# Patient Record
Sex: MALE | Race: BLACK OR AFRICAN AMERICAN | NOT HISPANIC OR LATINO | URBAN - METROPOLITAN AREA
[De-identification: names, ages, dates, MRNs, and addresses within clinical notes are randomized per-mention and may not be internally consistent; named-entity substitution may affect disease eponyms.]

---

## 2023-08-10 ENCOUNTER — EMERGENCY (EMERGENCY)
Facility: HOSPITAL | Age: 23
LOS: 1 days | Discharge: HOME | End: 2023-08-10
Attending: EMERGENCY MEDICINE | Admitting: EMERGENCY MEDICINE
Payer: COMMERCIAL

## 2023-08-10 VITALS
RESPIRATION RATE: 16 BRPM | DIASTOLIC BLOOD PRESSURE: 81 MMHG | HEART RATE: 96 BPM | OXYGEN SATURATION: 99 % | SYSTOLIC BLOOD PRESSURE: 144 MMHG | TEMPERATURE: 98 F

## 2023-08-10 DIAGNOSIS — T78.3XXA ANGIONEUROTIC EDEMA, INITIAL ENCOUNTER: ICD-10-CM

## 2023-08-10 PROCEDURE — 99223 1ST HOSP IP/OBS HIGH 75: CPT

## 2023-08-10 RX ORDER — FAMOTIDINE 10 MG/ML
20 INJECTION INTRAVENOUS EVERY 12 HOURS
Refills: 0 | Status: DISCONTINUED | OUTPATIENT
Start: 2023-08-10 | End: 2023-08-13

## 2023-08-10 RX ORDER — DEXAMETHASONE 0.5 MG/5ML
10 ELIXIR ORAL EVERY 8 HOURS
Refills: 0 | Status: DISCONTINUED | OUTPATIENT
Start: 2023-08-10 | End: 2023-08-10

## 2023-08-10 RX ORDER — DIPHENHYDRAMINE HCL 50 MG
25 CAPSULE ORAL ONCE
Refills: 0 | Status: COMPLETED | OUTPATIENT
Start: 2023-08-10 | End: 2023-08-10

## 2023-08-10 RX ORDER — DEXAMETHASONE 0.5 MG/5ML
10 ELIXIR ORAL ONCE
Refills: 0 | Status: COMPLETED | OUTPATIENT
Start: 2023-08-10 | End: 2023-08-10

## 2023-08-10 RX ORDER — DIPHENHYDRAMINE HCL 50 MG
25 CAPSULE ORAL EVERY 8 HOURS
Refills: 0 | Status: DISCONTINUED | OUTPATIENT
Start: 2023-08-10 | End: 2023-08-13

## 2023-08-10 RX ORDER — FAMOTIDINE 10 MG/ML
20 INJECTION INTRAVENOUS ONCE
Refills: 0 | Status: COMPLETED | OUTPATIENT
Start: 2023-08-10 | End: 2023-08-10

## 2023-08-10 RX ORDER — SODIUM CHLORIDE 9 MG/ML
1000 INJECTION INTRAMUSCULAR; INTRAVENOUS; SUBCUTANEOUS
Refills: 0 | Status: DISCONTINUED | OUTPATIENT
Start: 2023-08-10 | End: 2023-08-13

## 2023-08-10 RX ORDER — DEXAMETHASONE 0.5 MG/5ML
10 ELIXIR ORAL EVERY 8 HOURS
Refills: 0 | Status: DISCONTINUED | OUTPATIENT
Start: 2023-08-10 | End: 2023-08-13

## 2023-08-10 RX ADMIN — SODIUM CHLORIDE 100 MILLILITER(S): 9 INJECTION INTRAMUSCULAR; INTRAVENOUS; SUBCUTANEOUS at 19:46

## 2023-08-10 RX ADMIN — Medication 102 MILLIGRAM(S): at 20:48

## 2023-08-10 RX ADMIN — Medication 25 MILLIGRAM(S): at 20:47

## 2023-08-10 RX ADMIN — Medication 102 MILLIGRAM(S): at 12:45

## 2023-08-10 RX ADMIN — Medication 25 MILLIGRAM(S): at 12:42

## 2023-08-10 RX ADMIN — FAMOTIDINE 20 MILLIGRAM(S): 10 INJECTION INTRAVENOUS at 12:42

## 2023-08-10 NOTE — ED ADULT TRIAGE NOTE - CHIEF COMPLAINT QUOTE
Pt presents for possible allergic reaction to peach, pt endorses swelling/difficulty swallowing/speaking related to tightness to throat, pt took 50mg of benadryl. Pt denies shortness of breath. No known allergies prior.

## 2023-08-10 NOTE — CONSULT NOTE ADULT - SUBJECTIVE AND OBJECTIVE BOX
CC: Airway evaluation    HPI: 23-year-old male with PMH of seasonal allergy presents for concern of allergic reaction.  Patient ate a peach slice around 9:20 AM and then developed shortness of breath, throat tightness, dysphagia with difficulty speaking and had 1 episode of nonbloody nonbilious vomiting.  Patient feels like he has throat swelling. Patient was given 50 mg of Benadryl at home by his friend who is a pharmacist tech that was with him.  Patient never ate peaches before in his life.  Mom reports allergy to pollen.  Denies chest pain abdominal pain, rashes, history of anaphylaxis. ENT called to evalaute the airway.     Patient seen and examined at bedside, reports the swollen sensation has improved significantly, able to tolerates own secretion, no difficulty breathing, no stridor and no drooling.     PAST MEDICAL & SURGICAL HISTORY:    Allergies    No Known Allergies    Intolerances      MEDICATIONS  (STANDING):    MEDICATIONS  (PRN):      Social History: never smoker, never drinker    Family history: No pertinent family history in first degree relatives    ROS:   ENT: all negative except as noted in HPI   CV: denies palpitations  Pulm: denies SOB, cough, hemoptysis  GI: denies change in appetite, indigestion, n/v  : denies pertinent urinary symptoms, urgency  Neuro: denies numbness/tingling, loss of sensation  Psych: denies anxiety  MS: denies muscle weakness, instability  Heme: denies easy bruising or bleeding  Endo: denies heat/cold intolerance, excessive sweating  Vascular: denies LE edema    Vital Signs Last 24 Hrs  T(C): 36.7 (10 Aug 2023 10:32), Max: 36.7 (10 Aug 2023 10:32)  T(F): 98 (10 Aug 2023 10:32), Max: 98 (10 Aug 2023 10:32)  HR: 96 (10 Aug 2023 10:32) (96 - 96)  BP: 144/81 (10 Aug 2023 10:32) (144/81 - 144/81)  BP(mean): --  RR: 16 (10 Aug 2023 10:32) (16 - 16)  SpO2: 99% (10 Aug 2023 10:32) (99% - 99%)    Parameters below as of 10 Aug 2023 10:32  Patient On (Oxygen Delivery Method): room air                  PHYSICAL EXAM:  Gen: NAD  Skin: No rashes, bruises, or lesions  Head: Normocephalic, Atraumatic  Face: no edema, erythema, or fluctuance. Parotid glands soft without mass  Eyes: no scleral injection  Ears: No evidence of any fluid drainage. No mastoid tenderness, erythema, or ear bulging  Nose: Nares bilaterally patent, no discharge  Mouth: No stridor, no drooling, no trismus.  Mucosa moist, tongue/uvula midline, oropharynx clear  Neck: Flat, supple, no lymphadenopathy, trachea midline, no masses  Lymphatic: No lymphadenopathy  Resp: breathing easily, no stridor  CV: no peripheral edema/cyanosis  GI: nondistended   Peripheral vascular: no JVD or edema  Neuro: facial nerve intact, no facial droop    Fiberoptic Indirect laryngoscopy:  (Scope #2 used)  Flexible laryngoscopy was performed and revealed the following:    -- Nasopharynx had no mass or exudate.    -- Posterior pharyngeal wall clear, no edema, and no erythema    -- Base of tongue was symmetric and not enlarged     -- Vallecula was clear    -- Epiglottis, both aryepiglottic folds and both false vocal folds were normal    -- Bilateral watery edema of the arytenoid, R worse than left    -- True vocal folds were fully mobile and without lesions.     -- Post cricoid area clear, no edema and no erythema    -- Interarytenoid edema was absent    -- Airway patent CC: Airway evaluation    HPI: 23-year-old male with PMH of seasonal allergy presents for concern of allergic reaction.  Patient ate a peach slice around 9:20 AM and then developed shortness of breath, throat tightness, dysphagia with difficulty speaking and had 1 episode of nonbloody nonbilious vomiting.  Patient feels like he has throat swelling. Patient was given 50 mg of Benadryl at home by his friend who is a pharmacist tech that was with him.  Patient never ate peaches before in his life.  Mom reports allergy to pollen.  Denies chest pain abdominal pain, rashes, history of anaphylaxis. ENT called to evaluate the airway.     Patient seen and examined at bedside, reports the swollen sensation has improved significantly, able to tolerates own secretion, no difficulty breathing, no stridor and no drooling.     PAST MEDICAL & SURGICAL HISTORY:    Allergies    No Known Allergies    Intolerances      MEDICATIONS  (STANDING):    MEDICATIONS  (PRN):      Social History: never smoker, never drinker    Family history: No pertinent family history in first degree relatives    ROS:   ENT: all negative except as noted in HPI   CV: denies palpitations  Pulm: denies SOB, cough, hemoptysis  GI: denies change in appetite, indigestion, n/v  : denies pertinent urinary symptoms, urgency  Neuro: denies numbness/tingling, loss of sensation  Psych: denies anxiety  MS: denies muscle weakness, instability  Heme: denies easy bruising or bleeding  Endo: denies heat/cold intolerance, excessive sweating  Vascular: denies LE edema    Vital Signs Last 24 Hrs  T(C): 36.7 (10 Aug 2023 10:32), Max: 36.7 (10 Aug 2023 10:32)  T(F): 98 (10 Aug 2023 10:32), Max: 98 (10 Aug 2023 10:32)  HR: 96 (10 Aug 2023 10:32) (96 - 96)  BP: 144/81 (10 Aug 2023 10:32) (144/81 - 144/81)  BP(mean): --  RR: 16 (10 Aug 2023 10:32) (16 - 16)  SpO2: 99% (10 Aug 2023 10:32) (99% - 99%)    Parameters below as of 10 Aug 2023 10:32  Patient On (Oxygen Delivery Method): room air                  PHYSICAL EXAM:  Gen: NAD  Skin: No rashes, bruises, or lesions  Head: Normocephalic, Atraumatic  Face: no edema, erythema, or fluctuance. Parotid glands soft without mass  Eyes: no scleral injection  Ears: No evidence of any fluid drainage. No mastoid tenderness, erythema, or ear bulging  Nose: Nares bilaterally patent, no discharge  Mouth: No stridor, no drooling, no trismus.  Mucosa moist, tongue/uvula midline, oropharynx clear  Neck: Flat, supple, no lymphadenopathy, trachea midline, no masses  Lymphatic: No lymphadenopathy  Resp: breathing easily, no stridor  CV: no peripheral edema/cyanosis  GI: nondistended   Peripheral vascular: no JVD or edema  Neuro: facial nerve intact, no facial droop    Fiberoptic Indirect laryngoscopy:  (Scope #2 used)  Flexible laryngoscopy was performed and revealed the following:    -- Nasopharynx had no mass or exudate.    -- Posterior pharyngeal wall clear, no edema, and no erythema    -- Base of tongue was symmetric and not enlarged     -- Vallecula was clear    -- Epiglottis, both aryepiglottic folds and both false vocal folds were normal    -- Bilateral watery edema of the arytenoid, R worse than left    -- True vocal folds were fully mobile and without lesions.     -- Post cricoid area clear, no edema and no erythema    -- Interarytenoid edema was absent    -- Airway patent

## 2023-08-10 NOTE — ED ADULT NURSE REASSESSMENT NOTE - NS ED NURSE REASSESS COMMENT FT1
Break RN: pt resting comfortably in ED stretcher at this time, friend at bedside. pt is able to speak in short sentences, spitting up oral secretions. spo2 WNL, respirations are even and nonlabored on RA. 22g IV placed in RAC, medicated as ordered per EMR with no acute adverse reactions noted. no requests at this time.

## 2023-08-10 NOTE — ED ADULT NURSE NOTE - OBJECTIVE STATEMENT
Received pt in room 23, 23 yr/o male A+OX4, ambulatory at baseline. denies significant past medical history, pt has seasonal allergies. pt presented to the ED C/O allergic reaction to peaches, was given benadryl at home. VSS, denies chest pain and SOB, RR even and unlabored. lung sounds are clear and equal bilaterally.  airway appears clear, no edema noted. throat looks red and irritated. pt states he is unable to speak due to throat swelling and is using note pad to communicate. PERRLA and facial symmetry noted. abdomen is soft, flat, nontender, denies abdominal pain nausea, vomiting, diarrhea, and constipation. pt has active and passive ROM of all extremities. no obvious joint deformity noted. denies numbness, tingling, and weakness in peripheral extremities. skin is clean dry and intact. as per MD, no need for IV at this time.

## 2023-08-10 NOTE — ED CDU PROVIDER INITIAL DAY NOTE - NS ED ATTENDING STATEMENT MOD
This was a shared visit with the TRANG. I reviewed and verified the documentation and independently performed the documented:

## 2023-08-10 NOTE — ED PROVIDER NOTE - OBJECTIVE STATEMENT
23-year-old male with no PMH presents for concern of allergic reaction.  Patient ate a peach slice around 9:20 AM and then developed shortness of breath and had 1 episode of nonbloody nonbilious vomiting.  Patient feels like he has throat swelling. Patient was given 50 mg of Benadryl at home by his friend who is a pharmacist that was with him.  Patient never ate peaches before in his life.  Mom reports allergy to pollen.  Denies chest pain abdominal pain, rashes, history of anaphylaxis. 23-year-old male with no PMH presents for concern of allergic reaction.  Patient ate a peach slice around 9:20 AM and then developed shortness of breath and had 1 episode of nonbloody nonbilious vomiting.  Patient feels like he has throat swelling. Patient was given 50 mg of Benadryl at home by his friend who is a pharmacist that was with him.  Patient never ate peaches before in his life.  Mom reports allergy to pollen.  Denies chest pain abdominal pain, rashes, history of anaphylaxis.    Attending/Uri: 24 yo M as described above, no sig PMHx reports today after eating a peach candy slice of sensation of throat tightness and dysphagia with difficulty speaking. No prior h/o allergic reaction other than seasonal allergies. Deensi rash.

## 2023-08-10 NOTE — CONSULT NOTE ADULT - PROBLEM SELECTOR RECOMMENDATION 9
- Exam finding consistent with airway angioedema  - Decadron 10mg once  - benadryl 50mg bid  - pepcid 40mg bid  - HOB elevation  - ENT plan to rescope at 7pm, sooner if patient develeop acute respiratory distress  - call ENT if symptoms worsen or pt develops stridor   - Case discussed with Dr. Antunez - Exam finding consistent with airway angioedema with patent airway  - Decadron 10mg once  - benadryl 50mg bid  - pepcid 40mg bid  - HOB elevation  - ENT plan to rescope at 7pm, sooner if patient develeop acute respiratory distress  - call ENT if symptoms worsen or pt develops stridor   - Case discussed with Dr. Antunez

## 2023-08-10 NOTE — ED CDU PROVIDER INITIAL DAY NOTE - CLINICAL SUMMARY MEDICAL DECISION MAKING FREE TEXT BOX
23-year-old male with no PMH presents for concern of allergic reaction.  Patient ate a peach slice around 9:20 AM and then developed shortness of breath and had 1 episode of nonbloody nonbilious vomiting.  Patient feels like he has throat swelling. Patient was given 50 mg of Benadryl at home by his friend who is a pharmacist that was with him.  Patient never ate peaches before in his life.  Mom reports allergy to pollen.   continued allergic symptom management, airway monitoring   ENT scoped with noted airway angioedema.

## 2023-08-10 NOTE — ED PROVIDER NOTE - CLINICAL SUMMARY MEDICAL DECISION MAKING FREE TEXT BOX
23-year-old male with no PMH, no known allergies presents for presumed allergic reaction to peach.  Patient developed shortness of breath and vomiting after eating a piece lights at 9:20 AM.  On exam patient is in no acute distress, vital signs stable, hemodynamically stable, lung sounds clear to auscultation bilaterally, no rashes noted, posterior oropharyngeal erythema but no swelling.  Will consult ENT to scope patient.  Dispo pending clinical course.

## 2023-08-10 NOTE — ED CDU PROVIDER INITIAL DAY NOTE - OBJECTIVE STATEMENT
23-year-old male with no PMH presents for concern of allergic reaction.  Patient ate a peach slice around 9:20 AM and then developed shortness of breath and had 1 episode of nonbloody nonbilious vomiting.  Patient feels like he has throat swelling. Patient was given 50 mg of Benadryl at home by his friend who is a pharmacist that was with him.  Patient never ate peaches before in his life.  Mom reports allergy to pollen.  Denies chest pain abdominal pain, rashes, history of anaphylaxis. 23-year-old male with no PMH presents for concern of allergic reaction.  Patient ate a peach slice around 9:20 AM and then developed shortness of breath and had 1 episode of nonbloody nonbilious vomiting.  Patient feels like he has throat swelling. Patient was given 50 mg of Benadryl at home by his friend who is a pharmacist that was with him.  Patient never ate peaches before in his life.  Mom reports allergy to pollen.  Denies chest pain abdominal pain, rashes, history of anaphylaxis.    Attending/Uri: 24 yo M as described above, no sig PMHx had presented to the ED reporting a sensation of throat "tightness" after eating a peach flavored candy. In addition to throat tightness he reports difficulty speaking, and odynophagia.  Patient was seen and evaluated by ENT and noted to have [id number]; swelling. He was started on steroids and H1/H2 blockers and transferred to the CDU for further monitoring and management.

## 2023-08-10 NOTE — CONSULT NOTE ADULT - ASSESSMENT
23 M presents to ED for allergic reaction after eating fresh peach for the first time found to have angioedema of the airway with bilateral arytenoid watery edema, right worse than left on laryngoscope exam. Patient is not in any acute respiratory distress, able to tolerate own secretion no voice changes, no stridor and no drooling.  23 M presents to ED for allergic reaction after eating fresh peach for the first time found to have angioedema of the airway with bilateral arytenoid watery edema, right worse than left on laryngoscope exam, patent airway. Patient is not in any acute respiratory distress, able to tolerate own secretion no voice changes, no stridor and no drooling.

## 2023-08-10 NOTE — ED PROVIDER NOTE - PROGRESS NOTE DETAILS
Ryan Moreira MD PGY-2: ENT aware, will scope patient Uri: ENT scope reveal swelled laryngeal swelling, pt to received steroids, H1/H2 blockers, monitor and re-evaluated by ENT in 6 hours for repeat scope Ryan Moreira MD PGY-2: Pt reassessed, HDS, feels better no longer SOB. ENT to scope at 7pm

## 2023-08-10 NOTE — ED PROVIDER NOTE - PHYSICAL EXAMINATION
GENERAL: well appearing in no acute distress, non-toxic appearing  HEAD: normocephalic, atraumatic  HEENT: normal conjunctiva, oral mucosa moist, uvula midline, no oropharyngeal swelling noted  CARDIAC: regular rate and rhythm, normal S1S2, no appreciable murmurs, 2+ pulses in UE b/l  PULM: normal breath sounds, clear to ascultation bilaterally, no rales, rhonchi, wheezing  GI: abdomen nondistended, soft, nontender, no guarding, rebound tenderness  : no CVA tenderness b/l, no suprapubic tenderness  NEURO: normal speech, PER, AAOx3  MSK: no peripheral edema  SKIN: well-perfused, extremities warm, no visible rashes  PSYCH: appropriate mood and affect GENERAL: well appearing in no acute distress, non-toxic appearing  HEAD: normocephalic, atraumatic  HEENT: normal conjunctiva, oral mucosa moist, uvula midline, no oropharyngeal swelling noted  CARDIAC: regular rate and rhythm, normal S1S2, no appreciable murmurs, 2+ pulses in UE b/l  PULM: normal breath sounds, clear to ascultation bilaterally, no rales, rhonchi, wheezing  GI: abdomen nondistended, soft, nontender, no guarding, rebound tenderness  : no CVA tenderness b/l, no suprapubic tenderness  NEURO: normal speech, PER, AAOx3  MSK: no peripheral edema  SKIN: well-perfused, extremities warm, no visible rashes  PSYCH: appropriate mood and affect    Attending/Uri: NAD; PERRL/EOMI, non-icterus, O/P-posterior pharynx-mild erythema, no swelling, no pooling of secretions, no stridor, supple, no KUSH, no JVD, RRR, CTAB; Abd-soft, NT/ND, no HSM; no LE edema, A&Ox3, nonfocal; Skin-warm/dry

## 2023-08-11 VITALS
TEMPERATURE: 99 F | SYSTOLIC BLOOD PRESSURE: 120 MMHG | OXYGEN SATURATION: 100 % | DIASTOLIC BLOOD PRESSURE: 71 MMHG | RESPIRATION RATE: 18 BRPM | HEART RATE: 94 BPM

## 2023-08-11 PROCEDURE — 99239 HOSP IP/OBS DSCHRG MGMT >30: CPT

## 2023-08-11 RX ORDER — EPINEPHRINE 0.3 MG/.3ML
0.3 INJECTION INTRAMUSCULAR; SUBCUTANEOUS
Qty: 2 | Refills: 0
Start: 2023-08-11 | End: 2023-08-11

## 2023-08-11 RX ADMIN — Medication 25 MILLIGRAM(S): at 04:31

## 2023-08-11 RX ADMIN — Medication 102 MILLIGRAM(S): at 04:31

## 2023-08-11 RX ADMIN — SODIUM CHLORIDE 100 MILLILITER(S): 9 INJECTION INTRAMUSCULAR; INTRAVENOUS; SUBCUTANEOUS at 07:07

## 2023-08-11 NOTE — ED CDU PROVIDER SUBSEQUENT DAY NOTE - ATTENDING APP SHARED VISIT CONTRIBUTION OF CARE
Attending note:   After face to face evaluation of this patient, I concur with above noted hx, pe, and care plan for this patient.  Tuttle: 23-year-old male presented to ED with symptoms of sensation of throat closing after eating a peach.  Patient was initially scoped by ENT and noted some edema.  Patient was given Decadron and Pepcid and Benadryl and placed in CDU for further observation.  Patient was reevaluated by ENT this morning with almost complete resolution of previous findings.  Patient notes significant improvement in symptoms and feels at baseline.  Patient's oropharynx is normal and lungs are clear and heart is regular rate and rhythm.  Vitals are stable.  Patient is stable for discharge on Pepcid and Benadryl.

## 2023-08-11 NOTE — ED CDU PROVIDER SUBSEQUENT DAY NOTE - HISTORY
Patient is a 23-year-old male accepted to CDU for allergic reaction after eating a peach.  In interim patient is resting comfortably, no acute respiratory distress, patient continuing to receive Benadryl, steroids.  He is pending reevaluation from ENT.

## 2023-08-11 NOTE — PROGRESS NOTE ADULT - PROBLEM SELECTOR PLAN 1
- Improved and laryngeal edema resolved, patent airway  - Please discharge patient home on benadryl and pepcid for 24 hours  - HOB elevation  - Return precaution discussed with patient in detail  - Cross reaction printout given to patient as well by ED provider  - Advise patient to follow up outpatient with allergy and immunology for allergy workup  - Avoid eating peaches at all times   - Regular diet  - Patient is clear from ENT perspective to be discharged home this morning  - Dispo per ED/CDU  - Case discussed with Dr. Antunez.

## 2023-08-11 NOTE — ED CDU PROVIDER SUBSEQUENT DAY NOTE - PHYSICAL EXAMINATION
GENERAL: well appearing in no acute distress, non-toxic appearing  HEAD: normocephalic, atraumatic  HEENT: normal conjunctiva, oral mucosa moist, uvula midline, no oropharyngeal swelling noted  CARDIAC: regular rate and rhythm, normal S1S2, no appreciable murmurs, 2+ pulses in UE b/l  PULM: normal breath sounds, clear to ascultation bilaterally, no rales, rhonchi, wheezing  GI: abdomen nondistended, soft, nontender, no guarding, rebound tenderness  : no CVA tenderness b/l, no suprapubic tenderness  NEURO: normal speech, PER, AAOx3  MSK: no peripheral edema  SKIN: well-perfused, extremities warm, no visible rashes  PSYCH: appropriate mood and affect

## 2023-08-11 NOTE — PROGRESS NOTE ADULT - ASSESSMENT
Assessment and Plan:  CE BEVERLY is a  23 M presents to ED for allergic reaction after eating fresh peach for the first time found to have angioedema of the airway with bilateral arytenoid watery edema, patent airway. Exam similar to previous scope earlier today. Patient is not in any acute respiratory distress, able to tolerate own secretion no voice changes, no stridor and no drooling.     Recommendation: - Exam finding consistent with airway angioedema with patent airway  - Re-dose decadron, benadryl, pepcid  - HOB elevation  - ENT plan to assess early tomorrow morning, sooner if patient developes acute respiratory distress  - call ENT if symptoms worsen or pt develops stridor   - Case discussed with Dr. Antunez.    Page ENT with any questions/concerns.  Peds Page #[id number]  Adult Page #[id number]    Mary Bynum  08-10-23 @ 20:19
24 y/o M presents to ED for allergic reaction after eating fresh peach for the first time found to have angioedema of the airway with bilateral arytenoid watery edema, patent airway on initial scope exam, repeat scope exam from last night shows similar findings. Patient is s/p decadron 10mg IV x 3 dose. Patient seen and examined at bedside this morning. Laryngoscopy shows resolution of bilateral arytenoid edema, slight erythematous of the right arytenoid, patent airway. Patient is not in any acute respiratory distress, able to tolerate own secretion no voice changes, no stridor and no drooling.

## 2023-08-11 NOTE — PROGRESS NOTE ADULT - SUBJECTIVE AND OBJECTIVE BOX
OTOLARYNGOLOGY (ENT) PROGRESS NOTE    PATIENT: CE BEVERLY  MRN: [id number]  : [birth year]  [id number]  DATE OF SERVICE:  08-10-23  			           Subjective/ Interval: Patient seen and examined at bedside. He reports that he is feeling better with less throat pain and swollen sensation. He is able to tolerates own secretions, no difficulty breathing, no stridor and no drooling.     ALLERGIES:  No Known Allergies      MEDICATIONS:  Antiinfectives:     IV fluids:  sodium chloride 0.9%. 1000 milliLiter(s) IV Continuous <Continuous>    Hematologic/Anticoagulation:    Pain medications/Neuro:    Endocrine Medications:   dexAMETHasone  IVPB 10 milliGRAM(s) IV Intermittent every 8 hours    All other standing medications:   diphenhydrAMINE Injectable 25 milliGRAM(s) IV Push every 8 hours  famotidine Injectable 20 milliGRAM(s) IV Push every 12 hours    All other PRN medications:    Vital Signs Last 24 Hrs  T(C): 36.5 (10 Aug 2023 19:16), Max: 36.7 (10 Aug 2023 10:32)  T(F): 97.7 (10 Aug 2023 19:16), Max: 98 (10 Aug 2023 10:32)  HR: 88 (10 Aug 2023 19:16) (78 - 96)  BP: 111/59 (10 Aug 2023 19:16) (111/59 - 144/81)  BP(mean): --  RR: 16 (10 Aug 2023 19:16) (16 - 16)  SpO2: 99% (10 Aug 2023 19:16) (99% - 100%)    Parameters below as of 10 Aug 2023 19:16  Patient On (Oxygen Delivery Method): room air                PHYSICAL EXAM:  Gen: NAD  Skin: No rashes, bruises, or lesions  Head: Normocephalic, Atraumatic  Face: no edema, erythema, or fluctuance. Parotid glands soft without mass  Eyes: no scleral injection  Ears: No evidence of any fluid drainage. No mastoid tenderness, erythema, or ear bulging  Nose: Nares bilaterally patent, no discharge  Mouth: No stridor, no drooling, no trismus.  Mucosa moist, tongue/uvula midline, oropharynx clear  Neck: Flat, supple, no lymphadenopathy, trachea midline, no masses  Resp: breathing easily, no stridor  Neuro: facial nerve intact, no facial droop    Fiberoptic Indirect laryngoscopy:  (Scope #2 used)  Flexible laryngoscopy was performed and revealed the following:    -- Nasopharynx had no mass or exudate.    -- Posterior pharyngeal wall clear, no edema, and no erythema    -- Base of tongue was symmetric and not enlarged     -- Vallecula was clear    -- Epiglottis with mild watery edema on lingual side    -- Both aryepiglottic folds and both false vocal folds were normal    -- True vocal folds were fully mobile and without lesions.     -- Post cricoid area clear, no edema and no erythema    -- Mild watery edema of both arytenoids    -- Airway patent             LABS             Coagulation Studies-       Endocrine Panel-                MICROBIOLOGY:        RADIOLOGY & ADDITIONAL STUDIES:  
ENT ISSUE/POD: Angioedema     HPI: Patient seen and examined at bedside. No acute complaints, and no acute event overnight. Tolerated chicken sandwiches without issue last night. Denies SOB, chest pain, drooling, fever, chills, nausea, vomiting and abdominal pain.         PAST MEDICAL & SURGICAL HISTORY:    Allergies    No Known Allergies    Intolerances      MEDICATIONS  (STANDING):  dexAMETHasone  IVPB 10 milliGRAM(s) IV Intermittent every 8 hours  diphenhydrAMINE Injectable 25 milliGRAM(s) IV Push every 8 hours  famotidine Injectable 20 milliGRAM(s) IV Push every 12 hours  sodium chloride 0.9%. 1000 milliLiter(s) (100 mL/Hr) IV Continuous <Continuous>    MEDICATIONS  (PRN):      Social History: see consult note    Family history: see consult note    ROS:   ENT: all negative except as noted in HPI   Pulm: denies SOB, cough, hemoptysis  Neuro: denies numbness/tingling, loss of sensation  Endo: denies heat/cold intolerance, excessive sweating      Vital Signs Last 24 Hrs  T(C): 36.6 (11 Aug 2023 05:53), Max: 36.8 (10 Aug 2023 21:29)  T(F): 97.8 (11 Aug 2023 05:53), Max: 98.2 (10 Aug 2023 21:29)  HR: 87 (11 Aug 2023 05:53) (73 - 96)  BP: 113/65 (11 Aug 2023 05:53) (103/72 - 144/81)  BP(mean): --  RR: 18 (11 Aug 2023 05:53) (16 - 18)  SpO2: 98% (11 Aug 2023 05:53) (98% - 100%)    Parameters below as of 11 Aug 2023 05:53  Patient On (Oxygen Delivery Method): room air                  PHYSICAL EXAM:  Gen: NAD  Skin: No rashes, bruises, or lesions  Head: Normocephalic, Atraumatic  Face: no edema, erythema, or fluctuance. Parotid glands soft without mass  Eyes: no scleral injection  Ears: No evidence of any fluid drainage. No mastoid tenderness, erythema, or ear bulging  Nose: Nares bilaterally patent, no discharge  Mouth: No Stridor / Drooling / Trismus.  Mucosa moist, tongue/uvula midline, oropharynx clear  Neck: Flat, supple, no lymphadenopathy, trachea midline, no masses  Lymphatic: No lymphadenopathy  Resp: breathing easily, no stridor  Neuro: facial nerve intact, no facial droop    Flexible laryngoscopy was performed and revealed the following:    -- Nasopharynx had no mass or exudate.    -- Posterior pharyngeal wall clear, no edema, and no erythema    -- Base of tongue was symmetric and not enlarged     -- Vallecula was clear    -- Epiglottis, both aryepiglottic folds and both false vocal folds were normal    -- Arytenoids both without edema and right arytenoid with slight erythema    -- True vocal folds were fully mobile and without lesions.     -- Post cricoid area clear, no edema and no erythema    -- Interarytenoid edema was absent    -- Airway patent

## 2023-08-11 NOTE — ED CDU PROVIDER DISPOSITION NOTE - NSFOLLOWUPINSTRUCTIONS_ED_ALL_ED_FT
** You were seen in the emergency room for concern of allergic reaction.  While in the emergency room you were given's 3 doses of IV steroids, Benadryl, and Pepcid which helped improve symptoms.  You are initially seen by ENT who had done a scope and saw some swelling that improved when reevaluated this morning.  You no longer need to take steroids however we do recommend continuing with Benadryl and Pepcid.  Both of these medications are over-the-counter and you can pick them up at the pharmacy.  You may take Benadryl 25 mg every 8 hours for the next 2 to 3 days.  As it helps with allergic reaction, and Pepcid 1 tablet twice a day for the next 2 to 3 days as it helps with itching.  We have sent a prescription for eppen if there is a severe allergic reaction that develops. As this allergy is believed to be due to peaches please avoid foods especially those closely related to peaches as discussed such as apples, pears, apricots, cherries, melons, bananas, nuts such as hazelnut, or vegetables such as celery tuber and carrots.  We have printed and handed you a chart that also educates you on foods to avoid that may have a similar pollen that peaches are produced with.  Please follow-up with allergy as discussed a referral has been provided to you.  Please return if any new, worsening, concerning symptoms develop.**    Food Allergy    WHAT YOU NEED TO KNOW:    A food allergy is an immune system reaction to a food. A food allergen is an ingredient or chemical in a food that causes your immune system to react. Allergic reactions happen when your immune system fights too strongly against an allergen and causes you to get sick. Allergic reactions can happen within minutes to several hours after you eat, touch, or smell the food. You can also have a second reaction up to 8 hours later.    DISCHARGE INSTRUCTIONS:    Call your local emergency number (911 in the [place]) for signs or symptoms of anaphylaxis, such as trouble breathing, swelling in your mouth or throat, or wheezing. You may also have itching, a rash, hives, or feel like you are going to faint.    Seek care immediately if:    Your mouth, tongue, or throat swells.    You have itching or hives that spread all over your body.  Call your doctor if:    You have new or worsening rashes, hives, or itching.    You have an upset stomach or are vomiting.    You have stomach cramps or diarrhea.    You have questions or concerns about your condition or care.  Medicines: You may need any of the following:    Epinephrine is used to treat severe allergic reactions such as anaphylaxis.    Antihistamines decrease mild symptoms such as itching or a rash.    Steroids may be given to decrease inflammation.    Short-acting bronchodilators help open your airways quickly. These medicines relieve sudden, severe symptoms and start to work right away. They may be called rescue inhalers or relievers.    Take your medicine as directed. Contact your healthcare provider if you think your medicine is not helping or if you have side effects. Tell your provider if you are allergic to any medicine. Keep a list of the medicines, vitamins, and herbs you take. Include the amounts, and when and why you take them. Bring the list or the pill bottles to follow-up visits. Carry your medicine list with you in case of an emergency.  Steps to take for signs or symptoms of anaphylaxis:    Immediately give 1 shot of epinephrine only into the outer thigh muscle.  How to Give An Epinephrine Shot Adult      Leave the shot in place as directed. Your provider may recommend you leave it in place for up to 10 seconds before you remove it. This helps make sure all of the epinephrine is delivered.    Call 911 and go to the emergency department, even if the shot improved symptoms. Do not drive yourself. Bring the used epinephrine shot with you.  Safety precautions to take if you are at risk for anaphylaxis:    Keep 2 shots of epinephrine with you at all times. You may need a second shot, because epinephrine only works for about 20 minutes and symptoms may return. Your provider can show you and family members how to give the shot. Check the expiration date every month and replace it before it expires.    Create an action plan. Your provider can help you create a written plan that explains the allergy and an emergency plan to treat a reaction. The plan explains when to give a second epinephrine shot if symptoms return or do not improve after the first. Give copies of the action plan and emergency instructions to family members, work and school staff, and  providers. Show them how to give a shot of epinephrine. Update the plan as the allergy changes.    Do not eat the food that causes your allergy. Even a small taste can cause an allergic reaction. Your provider or a dietitian can help you plan a balanced diet. Babies may need to drink a formula that does not contain milk or soy. A dietitian can teach you how to read labels for ingredients that cause your allergies.    Carry medical alert identification. Wear jewelry or carry a card that says you have a food allergy. Ask your provider where to get these items.    Ask about ingredients in foods prepared outside your home. When you eat out, ask what is in the food you want to order. Ask how food is prepared. Fried foods may contain small amounts of food allergens, such as nuts and shellfish.    Be careful when you exercise. If you have had exercise-induced anaphylaxis, do not exercise right after you eat. Stop exercising right away if you start to develop any signs or symptoms of anaphylaxis. You may first feel tired, warm, or have itchy skin. Hives, swelling, and severe breathing problems may develop if you continue to exercise.    Wash your hands before and after meals. Do not share utensils or food.  Handwashing  Vaccines and egg allergy: Tell healthcare providers if you have an egg allergy before you receive any vaccine. Some vaccines contain egg protein that can cause an anaphylactic reaction. The flu vaccine is considered safe for a person with an egg allergy. An egg-free vaccine may be available. Your provider will tell you if you should get the egg-free vaccine instead. You may need tests before you can receive certain other vaccines. Your provider can give you more information.    Follow up with your doctor as directed: You may need to see specialists for ongoing care. Your provider may want to test you regularly to see if the food allergy changes. Write down your questions so you remember to ask them during follow-up visits.    © [employer] 1973, 2023

## 2023-08-11 NOTE — ED CDU PROVIDER DISPOSITION NOTE - NSFOLLOWUPCLINICS_GEN_ALL_ED_FT
[facility]  Allergy  [address] [place] [address]  Phone: [phone]  Fax:

## 2023-08-11 NOTE — ED CDU PROVIDER DISPOSITION NOTE - PATIENT PORTAL LINK FT
You can access the FollowMyHealth Patient Portal offered by [facility] by registering at the following website: [email or web address]. By joining [facility]’s FollowMyHealth portal, you will also be able to view your health information using other applications (apps) compatible with our system.

## 2023-08-11 NOTE — ED CDU PROVIDER DISPOSITION NOTE - CLINICAL COURSE
23-year-old male accepted to CDU for allergic reaction after eating a peach.  In interim patient is resting comfortably, no acute respiratory distress, patient continuing to receive Benadryl, steroids.  ENT saw pt again this morning and saw improvement on scope okay with dc home as pt got 3 round of IV steroids. Recommended continue benadryl for next few days and f/u with allergist. Discussed what foods to avoid with all peach allergy. Discussed strict return precaution and f/u with PMD/ allergist. Epipen also sent in case of emergency. Pt verbalized an understanding and agrees with the plan. IV removed.

## 2023-12-21 ENCOUNTER — APPOINTMENT (OUTPATIENT)
Dept: PEDIATRIC ALLERGY IMMUNOLOGY | Facility: CLINIC | Age: 23
End: 2023-12-21